# Patient Record
Sex: MALE | Race: BLACK OR AFRICAN AMERICAN | NOT HISPANIC OR LATINO | Employment: STUDENT | URBAN - METROPOLITAN AREA
[De-identification: names, ages, dates, MRNs, and addresses within clinical notes are randomized per-mention and may not be internally consistent; named-entity substitution may affect disease eponyms.]

---

## 2023-06-22 ENCOUNTER — OFFICE VISIT (OUTPATIENT)
Dept: PRIMARY CARE | Facility: CLINIC | Age: 23
End: 2023-06-22
Payer: MEDICAID

## 2023-06-22 ENCOUNTER — LAB (OUTPATIENT)
Dept: LAB | Facility: LAB | Age: 23
End: 2023-06-22
Payer: MEDICAID

## 2023-06-22 VITALS
WEIGHT: 315 LBS | HEART RATE: 110 BPM | SYSTOLIC BLOOD PRESSURE: 163 MMHG | HEIGHT: 75 IN | DIASTOLIC BLOOD PRESSURE: 91 MMHG | TEMPERATURE: 96.7 F | BODY MASS INDEX: 39.17 KG/M2 | OXYGEN SATURATION: 100 %

## 2023-06-22 DIAGNOSIS — I10 PRIMARY HYPERTENSION: ICD-10-CM

## 2023-06-22 DIAGNOSIS — R00.0 TACHYCARDIA: ICD-10-CM

## 2023-06-22 DIAGNOSIS — I10 PRIMARY HYPERTENSION: Primary | ICD-10-CM

## 2023-06-22 DIAGNOSIS — H61.22 IMPACTED CERUMEN OF LEFT EAR: ICD-10-CM

## 2023-06-22 PROBLEM — H01.00A BLEPHARITIS OF BOTH UPPER AND LOWER EYELID OF RIGHT EYE: Status: ACTIVE | Noted: 2023-06-22

## 2023-06-22 PROBLEM — H54.7 VISION IMPAIRMENT: Status: ACTIVE | Noted: 2023-06-22

## 2023-06-22 PROBLEM — F41.9 ANXIETY: Status: ACTIVE | Noted: 2023-06-22

## 2023-06-22 PROBLEM — H00.14 CHALAZION OF LEFT UPPER EYELID: Status: ACTIVE | Noted: 2023-06-22

## 2023-06-22 PROBLEM — F94.0 SELECTIVE MUTISM: Status: ACTIVE | Noted: 2023-06-22

## 2023-06-22 PROBLEM — H57.89 EYE DRAINAGE: Status: ACTIVE | Noted: 2023-06-22

## 2023-06-22 PROBLEM — K59.09 CONSTIPATION, CHRONIC: Status: ACTIVE | Noted: 2023-06-22

## 2023-06-22 PROBLEM — J45.909 ASTHMA (HHS-HCC): Status: ACTIVE | Noted: 2023-06-22

## 2023-06-22 PROBLEM — H01.00B BLEPHARITIS OF BOTH UPPER AND LOWER EYELID OF LEFT EYE: Status: ACTIVE | Noted: 2023-06-22

## 2023-06-22 PROBLEM — R03.0 WHITE COAT SYNDROME WITHOUT HYPERTENSION: Status: ACTIVE | Noted: 2023-06-22

## 2023-06-22 PROBLEM — E66.9 OBESITY: Status: ACTIVE | Noted: 2023-06-22

## 2023-06-22 LAB
ALBUMIN (G/DL) IN SER/PLAS: 4.9 G/DL (ref 3.4–5)
ANION GAP IN SER/PLAS: 17 MMOL/L (ref 10–20)
CALCIUM (MG/DL) IN SER/PLAS: 10.5 MG/DL (ref 8.6–10.6)
CARBON DIOXIDE, TOTAL (MMOL/L) IN SER/PLAS: 25 MMOL/L (ref 21–32)
CHLORIDE (MMOL/L) IN SER/PLAS: 103 MMOL/L (ref 98–107)
CHOLESTEROL (MG/DL) IN SER/PLAS: 202 MG/DL (ref 0–199)
CHOLESTEROL IN HDL (MG/DL) IN SER/PLAS: 60.6 MG/DL
CHOLESTEROL/HDL RATIO: 3.3
CREATININE (MG/DL) IN SER/PLAS: 0.82 MG/DL (ref 0.5–1.3)
ESTIMATED AVERAGE GLUCOSE FOR HBA1C: 108 MG/DL
GFR MALE: >90 ML/MIN/1.73M2
GLUCOSE (MG/DL) IN SER/PLAS: 94 MG/DL (ref 74–99)
HEMOGLOBIN A1C/HEMOGLOBIN TOTAL IN BLOOD: 5.4 %
LDL: 122 MG/DL (ref 0–109)
NON HDL CHOLESTEROL: 141 MG/DL (ref 0–119)
PHOSPHATE (MG/DL) IN SER/PLAS: 2.9 MG/DL (ref 2.5–4.9)
POTASSIUM (MMOL/L) IN SER/PLAS: 4.5 MMOL/L (ref 3.5–5.3)
SODIUM (MMOL/L) IN SER/PLAS: 140 MMOL/L (ref 136–145)
THYROTROPIN (MIU/L) IN SER/PLAS BY DETECTION LIMIT <= 0.05 MIU/L: 0.7 MIU/L (ref 0.44–3.98)
TRIGLYCERIDE (MG/DL) IN SER/PLAS: 99 MG/DL (ref 0–149)
UREA NITROGEN (MG/DL) IN SER/PLAS: 8 MG/DL (ref 6–23)
VLDL: 20 MG/DL (ref 0–40)

## 2023-06-22 PROCEDURE — 1036F TOBACCO NON-USER: CPT | Performed by: STUDENT IN AN ORGANIZED HEALTH CARE EDUCATION/TRAINING PROGRAM

## 2023-06-22 PROCEDURE — 99204 OFFICE O/P NEW MOD 45 MIN: CPT | Performed by: STUDENT IN AN ORGANIZED HEALTH CARE EDUCATION/TRAINING PROGRAM

## 2023-06-22 PROCEDURE — 83036 HEMOGLOBIN GLYCOSYLATED A1C: CPT

## 2023-06-22 PROCEDURE — 36415 COLL VENOUS BLD VENIPUNCTURE: CPT

## 2023-06-22 PROCEDURE — 3080F DIAST BP >= 90 MM HG: CPT | Performed by: STUDENT IN AN ORGANIZED HEALTH CARE EDUCATION/TRAINING PROGRAM

## 2023-06-22 PROCEDURE — 80061 LIPID PANEL: CPT

## 2023-06-22 PROCEDURE — 84443 ASSAY THYROID STIM HORMONE: CPT

## 2023-06-22 PROCEDURE — 69210 REMOVE IMPACTED EAR WAX UNI: CPT | Performed by: STUDENT IN AN ORGANIZED HEALTH CARE EDUCATION/TRAINING PROGRAM

## 2023-06-22 PROCEDURE — 3077F SYST BP >= 140 MM HG: CPT | Performed by: STUDENT IN AN ORGANIZED HEALTH CARE EDUCATION/TRAINING PROGRAM

## 2023-06-22 PROCEDURE — 80069 RENAL FUNCTION PANEL: CPT

## 2023-06-22 RX ORDER — ACETAMINOPHEN 325 MG/1
TABLET, FILM COATED ORAL EVERY 6 HOURS
COMMUNITY
Start: 2022-08-10

## 2023-06-22 RX ORDER — METOPROLOL SUCCINATE 25 MG/1
25 TABLET, EXTENDED RELEASE ORAL DAILY
Qty: 30 TABLET | Refills: 2 | Status: SHIPPED | OUTPATIENT
Start: 2023-06-22 | End: 2023-09-08 | Stop reason: ALTCHOICE

## 2023-06-22 RX ORDER — ALBUTEROL SULFATE 90 UG/1
AEROSOL, METERED RESPIRATORY (INHALATION)
COMMUNITY
Start: 2021-01-10

## 2023-06-22 RX ORDER — CHLORTHALIDONE 25 MG/1
12.5 TABLET ORAL DAILY
Qty: 15 TABLET | Refills: 2 | Status: SHIPPED | OUTPATIENT
Start: 2023-06-22 | End: 2023-09-08 | Stop reason: SDUPTHER

## 2023-06-22 RX ORDER — FLUTICASONE PROPIONATE 110 UG/1
AEROSOL, METERED RESPIRATORY (INHALATION) 2 TIMES DAILY
COMMUNITY
Start: 2021-01-21

## 2023-06-22 ASSESSMENT — PAIN SCALES - GENERAL: PAINLEVEL: 0-NO PAIN

## 2023-06-22 NOTE — PROGRESS NOTES
"Subjective   Patient ID: Erwin Smith is a 22 y.o. male who presents for Ear Fullness (Left ).    Here to establish care as an adult; presents with his mother.    Reports concern about blood pressure. Ongoing issues with BP since age 19. Has previously seen nephrology with 24-hour ambulatory blood pressure monitoring which was normal and was diagnosed with white coat hypertension. Family checks intermittently at home and number has been as high as 200s at home. Denies chest pain, dyspnea, leg swelling, headaches, vision changes. Has previously tried following the DASH diet strictly with resolution of hypertension but was unable to maintain that diet.    Decreased hearing in the left ear, ongoing for several months. Mother tried to flush the ear without success and patient tolerated it poorly so she did not try again. No pain in the ear.    Family history of hypertension in mother, siblings. Heart disease in grandmother and mother. Grave's disease in mother and younger sibling. Renal disease in younger sibling.    Review of Systems: As above in HPI    Objective   BP (!) 163/91   Pulse 110   Temp 35.9 °C (96.7 °F) (Tympanic)   Ht 1.905 m (6' 3\")   Wt (!) 166 kg (365 lb)   SpO2 100%   BMI 45.62 kg/m²  Body mass index is 45.62 kg/m².    Physical Exam  Vitals reviewed.   Constitutional:       General: He is not in acute distress.     Appearance: Normal appearance. He is obese.   HENT:      Head: Normocephalic and atraumatic.      Right Ear: Tympanic membrane and ear canal normal.      Left Ear: External ear normal. There is impacted cerumen (view of canal initially obscured by cerumen which was removed (see below procedure note) however canal remained obscured by impaced cerumen).   Eyes:      Conjunctiva/sclera: Conjunctivae normal.   Cardiovascular:      Rate and Rhythm: Normal rate and regular rhythm.      Heart sounds: No murmur heard.     No friction rub. No gallop.   Pulmonary:      Effort: Pulmonary " effort is normal.      Breath sounds: No wheezing, rhonchi or rales.   Abdominal:      Palpations: Abdomen is soft.      Tenderness: There is no abdominal tenderness.   Skin:     General: Skin is warm and dry.   Neurological:      General: No focal deficit present.      Mental Status: He is alert.       Ear Cerumen Removal    Date/Time: 6/22/2023 11:30 AM    Performed by: Genaro Ramirez MD  Authorized by: Lisa Cornell MD    Consent:     Consent obtained:  Verbal    Consent given by:  Patient    Risks, benefits, and alternatives were discussed: yes      Risks discussed:  Bleeding, pain and incomplete removal    Alternatives discussed:  Alternative treatment  Universal protocol:     Procedure explained and questions answered to patient or proxy's satisfaction: yes      Patient identity confirmed:  Verbally with patient  Procedure details:     Location:  L ear    Procedure type: curette      Procedure outcomes: cerumen removed (initially visualized obstructing cerumen was removed however deep impacted cerumen removal was not attempted)    Post-procedure details:     Inspection:  Some cerumen remaining    Hearing quality:  Diminished (unchanged)    Procedure completion:  Tolerated    Assessment/Plan   Problem List Items Addressed This Visit       Impacted cerumen of left ear     -incomplete removal in office  -recommended Debrox drops for several days followed by gentle irrigation  -follow up if no improvement         Relevant Medications    carbamide peroxide (Debrox) 6.5 % otic solution    Primary hypertension - Primary     -prior diagnosis of white coat hypertension however with persistently elevated BP at home and here  -given strong family history of hypertension and cardiac disease will opt to initiate treatment  -start chlorthalidone 12.5 mg daily  -check RFP, A1c, lipid panel for further evaluation  -DASH diet         Relevant Medications    chlorthalidone (Hygroton) 25 mg tablet    metoprolol  succinate XL (Toprol-XL) 25 mg 24 hr tablet    Other Relevant Orders    Renal Function Panel (Completed)    Lipid Panel (Completed)    Hemoglobin A1C (Completed)    Tachycardia     -suspect sinus tachycardia based on exam  -unclear cause, however there is a strong family history of thyroid disease  -check TSH  -start metoprolol succinate 25 mg daily in addition to chlorthalidone         Relevant Medications    metoprolol succinate XL (Toprol-XL) 25 mg 24 hr tablet    Other Relevant Orders    Tsh With Reflex To Free T4 If Abnormal (Completed)     Follow up in 2 weeks for reassessment of BP.    Patient was seen in conjunction with Elijah Carrillo MS3; much of the above history was obtained by the medical student and verified by myself.    Patient seen and discussed with Dr. Cornell.    Genaro Ramirez MD   PGY-2  [facility]

## 2023-06-22 NOTE — PATIENT INSTRUCTIONS
Dear Mr. Smith,     It was a pleasure getting to manage your care with you today.     Labs:  We ordered Labs for you at the  Labs.     Directions to  Labs:   1. You can find them as you exit our clinic walk past the coffee shop.   2. Turn Right and walk to the end of the zimmer (you will see a staircase).   3. When you arrive at the end of the zimmer, turn left and walk down the hallway with skylights.   4. Half way down the rj light hallway you will see the lab on your left    Prescriptions:  We have sent medication prescriptions to your pharmacy on file. Please pick them up at your earliest convenience.     Follow up Appointment: 2 weeks for blood pressure reevaluation    Emergency  In the case of an emergency please call 911 or visit the Emergency Department immediately for evaluation.     We look forward to continuing your care here at our Clinic. Take Care.     Sincerely,   Genaro Ramirez MD

## 2023-06-26 PROBLEM — R00.0 TACHYCARDIA: Status: ACTIVE | Noted: 2023-06-26

## 2023-06-26 NOTE — ASSESSMENT & PLAN NOTE
-incomplete removal in office  -recommended Debrox drops for several days followed by gentle irrigation  -follow up if no improvement

## 2023-06-26 NOTE — ASSESSMENT & PLAN NOTE
-prior diagnosis of white coat hypertension however with persistently elevated BP at home and here  -given strong family history of hypertension and cardiac disease will opt to initiate treatment  -start chlorthalidone 12.5 mg daily  -check RFP, A1c, lipid panel for further evaluation  -DASH diet

## 2023-06-26 NOTE — ASSESSMENT & PLAN NOTE
-suspect sinus tachycardia based on exam  -unclear cause, however there is a strong family history of thyroid disease  -check TSH  -start metoprolol succinate 25 mg daily in addition to chlorthalidone

## 2023-07-27 NOTE — PROGRESS NOTES
I saw and evaluated the patient. I personally obtained the key and critical portions of the history and physical exam or was physically present for key and critical portions performed by the resident/fellow. I reviewed the resident/fellow's documentation and discussed the patient with the resident/fellow. I agree with the resident/fellow's medical decision making as documented in the note.    Lisa Cornell MD

## 2023-09-08 ENCOUNTER — OFFICE VISIT (OUTPATIENT)
Dept: PRIMARY CARE | Facility: CLINIC | Age: 23
End: 2023-09-08
Payer: MEDICAID

## 2023-09-08 VITALS
HEART RATE: 111 BPM | SYSTOLIC BLOOD PRESSURE: 154 MMHG | TEMPERATURE: 98.6 F | OXYGEN SATURATION: 97 % | BODY MASS INDEX: 45.97 KG/M2 | WEIGHT: 315 LBS | DIASTOLIC BLOOD PRESSURE: 109 MMHG

## 2023-09-08 DIAGNOSIS — R00.0 TACHYCARDIA: Primary | ICD-10-CM

## 2023-09-08 DIAGNOSIS — I10 PRIMARY HYPERTENSION: ICD-10-CM

## 2023-09-08 LAB
ALBUMIN (MG/L) IN URINE: 7.1 MG/L
ALBUMIN/CREATININE (UG/MG) IN URINE: 4.2 UG/MG CRT (ref 0–30)
CREATININE (MG/DL) IN URINE: 169 MG/DL (ref 20–370)

## 2023-09-08 PROCEDURE — 82570 ASSAY OF URINE CREATININE: CPT

## 2023-09-08 PROCEDURE — 1036F TOBACCO NON-USER: CPT | Performed by: STUDENT IN AN ORGANIZED HEALTH CARE EDUCATION/TRAINING PROGRAM

## 2023-09-08 PROCEDURE — 3080F DIAST BP >= 90 MM HG: CPT | Performed by: STUDENT IN AN ORGANIZED HEALTH CARE EDUCATION/TRAINING PROGRAM

## 2023-09-08 PROCEDURE — 99213 OFFICE O/P EST LOW 20 MIN: CPT | Performed by: STUDENT IN AN ORGANIZED HEALTH CARE EDUCATION/TRAINING PROGRAM

## 2023-09-08 PROCEDURE — 3077F SYST BP >= 140 MM HG: CPT | Performed by: STUDENT IN AN ORGANIZED HEALTH CARE EDUCATION/TRAINING PROGRAM

## 2023-09-08 PROCEDURE — 93000 ELECTROCARDIOGRAM COMPLETE: CPT | Performed by: FAMILY MEDICINE

## 2023-09-08 PROCEDURE — 82043 UR ALBUMIN QUANTITATIVE: CPT

## 2023-09-08 RX ORDER — CHLORTHALIDONE 25 MG/1
12.5 TABLET ORAL DAILY
Qty: 15 TABLET | Refills: 2 | Status: SHIPPED | OUTPATIENT
Start: 2023-09-08 | End: 2024-05-28 | Stop reason: SDUPTHER

## 2023-09-08 RX ORDER — PROPRANOLOL HYDROCHLORIDE 20 MG/1
20 TABLET ORAL 2 TIMES DAILY
Qty: 60 TABLET | Refills: 2 | Status: SHIPPED | OUTPATIENT
Start: 2023-09-08 | End: 2024-05-28 | Stop reason: SDUPTHER

## 2023-09-08 NOTE — LETTER
September 8, 2023     Patient: Erwin Smith   YOB: 2000   Date of Visit: 9/8/2023       To Whom It May Concern:    Erwin Smith was seen in my clinic on 9/8/2023 at 2:30 pm. Please excuse Erwin for his absence from work on this day to make the appointment.    If you have any questions or concerns, please don't hesitate to call.         Sincerely,         Genaro Ramirez MD        CC: No Recipients

## 2023-09-08 NOTE — PATIENT INSTRUCTIONS
Dear Erwin Smith,     It was a pleasure getting to manage your care with you today.     Prescriptions:  We have sent medication prescriptions to your pharmacy on file. Please pick them up at your earliest convenience.   Stop taking the metoprolol. Start taking the propranolol instead.    Follow up Appointment: 2 weeks telehealth to review blood pressure logs    Emergency  In the case of an emergency please call 911 or visit the Emergency Department immediately for evaluation.     We look forward to continuing your care here at our Clinic. Take Care.     Sincerely,   Genaro Ramirez MD

## 2023-09-08 NOTE — PROGRESS NOTES
Subjective   Patient ID: Erwin Smith is a 22 y.o. male who presents for Follow-up.    Has been taking medications for blood pressure as prescribed. Was started on metoprolol and chlorthalidone last visit, reports taking normally. No headaches, no vision changes, no chest pain or difficulty breathing. No dysuria, hematuria, difficulty urinating. No leg swelling. Has long history of significant anxiety associated with doctor's visits and has prior diagnosis of white coat hypertension after normal 24-hour ambulatory blood pressure monitoring with nephrology ~2 years ago. No recent blood pressure checks at home.    Hearing has improved since last visit, no longer worried about cerumen obstruction.      Review of Systems   Eyes:  Negative for visual disturbance.   Respiratory:  Negative for shortness of breath.    Cardiovascular:  Negative for chest pain and leg swelling.   Genitourinary:  Negative for difficulty urinating, dysuria and hematuria.   Neurological:  Negative for headaches.     Objective   BP (!) 170/121 (BP Location: Right arm, Patient Position: Sitting, BP Cuff Size: Adult)   Pulse (!) 111   Temp 37 °C (98.6 °F) (Temporal)   Wt (!) 167 kg (367 lb 12.8 oz)   SpO2 97%   BMI 45.97 kg/m²  Body mass index is 45.97 kg/m².    Physical Exam  Vitals reviewed.   Constitutional:       General: He is not in acute distress.     Appearance: Normal appearance.   HENT:      Head: Normocephalic and atraumatic.   Eyes:      Conjunctiva/sclera: Conjunctivae normal.   Cardiovascular:      Rate and Rhythm: Regular rhythm. Tachycardia present.      Heart sounds: No murmur heard.     No friction rub. No gallop.   Pulmonary:      Effort: Pulmonary effort is normal. No respiratory distress.      Breath sounds: No wheezing, rhonchi or rales.   Skin:     General: Skin is warm and dry.   Neurological:      Mental Status: He is alert. Mental status is at baseline.   Psychiatric:         Behavior: Behavior normal.        Assessment/Plan   Problem List Items Addressed This Visit       Primary hypertension     -no evidence of LVH on ECG  -given that he has prior diagnosis of white coat hypertension patient was instructed to keep home blood pressure diary over next 2 weeks to monitor pressures at home  -continue chlorthalidone 12.5 mg daily in the interim  -will adjust therapy based on results         Relevant Medications    propranolol (Inderal) 20 mg tablet    chlorthalidone (Hygroton) 25 mg tablet    Other Relevant Orders    ECG 12 lead (Clinic Performed) (Completed)    Albumin , Urine Random (Completed)    Tachycardia - Primary     -EKG today shows NSR, although patient was tachycardic on arrival  -suspect there is an element of anxiety to tachycardia  -transition metoprolol to propranolol 20 mg BID for non-selective beta blocking properties         Relevant Medications    propranolol (Inderal) 20 mg tablet    Other Relevant Orders    ECG 12 lead (Clinic Performed) (Completed)     Follow up in 2 weeks after completion of home BP diary for reassessment.    Patient discussed with Dr. Whiting.    Genaro Ramirez MD   PGY-3  [facility]

## 2023-09-10 ASSESSMENT — ENCOUNTER SYMPTOMS
SHORTNESS OF BREATH: 0
HEADACHES: 0
DIFFICULTY URINATING: 0
DYSURIA: 0
HEMATURIA: 0

## 2023-09-11 NOTE — ASSESSMENT & PLAN NOTE
-EKG today shows NSR, although patient was tachycardic on arrival  -suspect there is an element of anxiety to tachycardia  -transition metoprolol to propranolol 20 mg BID for non-selective beta blocking properties

## 2023-09-11 NOTE — ASSESSMENT & PLAN NOTE
-no evidence of LVH on ECG  -given that he has prior diagnosis of white coat hypertension patient was instructed to keep home blood pressure diary over next 2 weeks to monitor pressures at home  -continue chlorthalidone 12.5 mg daily in the interim  -will adjust therapy based on results

## 2023-09-21 NOTE — PROGRESS NOTES
I reviewed with the resident the medical history and the resident’s findings on physical examination.  I discussed with the resident the patient’s diagnosis and concur with the treatment plan as documented in the resident note.     Mery Whiting MD

## 2024-04-23 ENCOUNTER — HOSPITAL ENCOUNTER (EMERGENCY)
Facility: HOSPITAL | Age: 24
Discharge: HOME | End: 2024-04-23
Attending: EMERGENCY MEDICINE
Payer: MEDICAID

## 2024-04-23 VITALS
TEMPERATURE: 98.1 F | OXYGEN SATURATION: 99 % | WEIGHT: 315 LBS | HEIGHT: 75 IN | BODY MASS INDEX: 39.17 KG/M2 | RESPIRATION RATE: 16 BRPM | SYSTOLIC BLOOD PRESSURE: 158 MMHG | HEART RATE: 93 BPM | DIASTOLIC BLOOD PRESSURE: 97 MMHG

## 2024-04-23 DIAGNOSIS — B95.8 MARGINAL KERATITIS OF RIGHT EYE DUE TO STAPHYLOCOCCUS TOXIN: ICD-10-CM

## 2024-04-23 DIAGNOSIS — H16.041 MARGINAL KERATITIS OF RIGHT EYE DUE TO STAPHYLOCOCCUS TOXIN: ICD-10-CM

## 2024-04-23 DIAGNOSIS — I10 HYPERTENSION, UNSPECIFIED TYPE: Primary | ICD-10-CM

## 2024-04-23 PROCEDURE — 99284 EMERGENCY DEPT VISIT MOD MDM: CPT | Performed by: EMERGENCY MEDICINE

## 2024-04-23 PROCEDURE — 99284 EMERGENCY DEPT VISIT MOD MDM: CPT

## 2024-04-23 PROCEDURE — 2500000001 HC RX 250 WO HCPCS SELF ADMINISTERED DRUGS (ALT 637 FOR MEDICARE OP): Mod: SE | Performed by: EMERGENCY MEDICINE

## 2024-04-23 RX ORDER — MOXIFLOXACIN 5 MG/ML
1 SOLUTION/ DROPS OPHTHALMIC 4 TIMES DAILY
Qty: 3 ML | Refills: 0 | Status: SHIPPED | OUTPATIENT
Start: 2024-04-23

## 2024-04-23 RX ORDER — CHLORTHALIDONE 25 MG/1
12.5 TABLET ORAL DAILY
Qty: 15 TABLET | Refills: 1 | Status: SHIPPED | OUTPATIENT
Start: 2024-04-23 | End: 2024-05-28 | Stop reason: SDUPTHER

## 2024-04-23 RX ORDER — PROPRANOLOL HYDROCHLORIDE 20 MG/1
20 TABLET ORAL 2 TIMES DAILY
Qty: 60 TABLET | Refills: 1 | Status: SHIPPED | OUTPATIENT
Start: 2024-04-23 | End: 2024-05-28 | Stop reason: SDUPTHER

## 2024-04-23 RX ORDER — TETRACAINE HYDROCHLORIDE 5 MG/ML
1 SOLUTION OPHTHALMIC ONCE
Status: COMPLETED | OUTPATIENT
Start: 2024-04-23 | End: 2024-04-23

## 2024-04-23 RX ORDER — PREDNISOLONE ACETATE 10 MG/ML
1 SUSPENSION/ DROPS OPHTHALMIC 3 TIMES DAILY
Qty: 5 ML | Refills: 0 | Status: SHIPPED | OUTPATIENT
Start: 2024-04-23

## 2024-04-23 RX ADMIN — TETRACAINE HYDROCHLORIDE 1 DROP: 5 SOLUTION OPHTHALMIC at 17:35

## 2024-04-23 RX ADMIN — FLUORESCEIN SODIUM 1 STRIP: 1 STRIP OPHTHALMIC at 17:35

## 2024-04-23 ASSESSMENT — PAIN DESCRIPTION - LOCATION: LOCATION: EYE

## 2024-04-23 ASSESSMENT — PAIN - FUNCTIONAL ASSESSMENT: PAIN_FUNCTIONAL_ASSESSMENT: 0-10

## 2024-04-23 ASSESSMENT — PAIN DESCRIPTION - PAIN TYPE: TYPE: ACUTE PAIN

## 2024-04-23 ASSESSMENT — COLUMBIA-SUICIDE SEVERITY RATING SCALE - C-SSRS
2. HAVE YOU ACTUALLY HAD ANY THOUGHTS OF KILLING YOURSELF?: NO
1. IN THE PAST MONTH, HAVE YOU WISHED YOU WERE DEAD OR WISHED YOU COULD GO TO SLEEP AND NOT WAKE UP?: NO
6. HAVE YOU EVER DONE ANYTHING, STARTED TO DO ANYTHING, OR PREPARED TO DO ANYTHING TO END YOUR LIFE?: NO

## 2024-04-23 ASSESSMENT — PAIN SCALES - GENERAL: PAINLEVEL_OUTOF10: 6

## 2024-04-23 NOTE — DISCHARGE INSTRUCTIONS
Use the 2 eyedrops as prescribed for your eye infection and use artificial tears as needed for comfort.  Continue with lid scrubs and apply warm compresses over the right eye.  If you have worsening redness of the eye, swelling, pain with eye movements, changes in vision or fevers return to the ER for evaluation.  Otherwise follow-up with ophthalmology.

## 2024-04-23 NOTE — Clinical Note
Erwin Smith was seen and treated in our emergency department on 4/23/2024.  He may return to work on 04/25/2024.       If you have any questions or concerns, please don't hesitate to call.      Carol Riggs, DO

## 2024-04-23 NOTE — CONSULTS
History of Present Illness:  This is a 23yoM with a hx of blepharitis and chalazia (status post (s/p) excision with Dr. Julien of the left upper eyelid (ROSALIO)) without known contact lens wear here for right eye redness, irritation, FBS. Reports vision is stable. No sick contacts and no recent URI. Works at a [employer]. No flashes or floaters. No eye pain.     ROS: All other systems have been reviewed and are negative.    PMHx: please refer to admission HPI  Medications: please refer to medication reconciliation  Allergies: please refer to patient allergy list  Past Ocular History: as per above HPI  Family History: reviewed and noncontributory to chief ophthalmic complaint  Orientation: Alert and oriented x3, appropriate mood and behavior    Examination:     Base Eye Exam       Visual Acuity (Snellen - Linear)         Right Left    Near sc 20/25 PH 20/20 20/20              Tonometry (Tonopen, 7:35 PM)         Right Left    Pressure 15 14              Pupils         Pupils Dark Light Shape React APD    Right PERRL, No APD 4 2 Round Reactive None    Left PERRL, No APD 4 2 Round Reactive None              Visual Fields         Left Right     Full Full              Extraocular Movement         Right Left     Full, Ortho Full, Ortho              Dilation       Both eyes: 2.5% phenylephrine, 1% tropicamide @ 7:35 PM                  Additional Tests       Color         Right Left    Ishihara 11/11 11/11                  Slit Lamp and Fundus Exam       Slit Lamp Exam         Right Left    Lids/Lashes Scalloped borders with 2+ MGD Scalloped borders with 2+ meibomian gland dysfunction (MGD), chalazion at the mid to nasal upper eyelid    Conjunctiva/Sclera Moderate injection Moderate injection    Cornea Multiple stromal infiltrates at 10, 1:30, 7, and 8 oclock with the largest being at 10 oclock. These are at the lid, cornea junction and in the peripheral cornea. Mild staining at the 10 oclock lesion. 1+ SPK    Anterior  Chamber Deep and quiet Deep and quiet    Iris Round and reactive Round and reactive    Lens Clear Clear    Anterior Vitreous Normal Normal              Fundus Exam         Right Left    Disc Normal Normal    C/D Ratio 0.3 0.3    Macula Normal Normal    Vessels Normal Normal    Periphery Normal Normal                        Assessment and Plan:  # Staph marginal keratitis  - Several stromal infiltrates in a circumferential patter at the peripheral cornea at 10, 1:30, 7, and 8 oclock at the lid, cornea junction. Hx of blepharitis and chalazia. Most likely an immune response characterizing the staph marginal keratitis picture. Other diagnoses considered were viral conjunctivitis however no apparent follicles, URI, sick contacts, or drainage, and HSV keratitis, however, no discernable dendrites. Bacterial keratitis also considered but given the lack of trauma and ulcer picture, in addition to multiple stromal infiltrates, less likely.  - Recommend prednisolone TID right eye for 7-10 days  - Given mild staining of one of the lesions, will recommend moxifloxacin QID right eye for 7-10 days  - Recommend AT QID both eyes for comfort  - Recommend WC, lid scrubs for hygiene  - Return precautions given for changes in vision, increased pain and redness, and drainage  - Ophthalmology will arrange follow up      Glynn Woodruff MD, PhD  Ophthalmology PGY-2      Ophthalmology Adult Pager - [phone]  Ophthalmology Pediatrics Pager - [phone]    For adult follow-up appointments, call: [phone]  For pediatric follow-up appointments, call: [phone]      NOTE: This note is not finalized until attending reviews and signs.

## 2024-04-23 NOTE — ED TRIAGE NOTES
Patient comes in today with R eye swelling and redness for the past 4 days; denies itchiness but states that it does hurt; denies blurry vision, endorses photophobia; denies any injury; states that he does have a history of styes

## 2024-04-23 NOTE — ED PROVIDER NOTES
HPI   Chief Complaint   Patient presents with    Eye Problem       23-year-old male with past medical history of hypertension presenting to the ED for right eye evaluation.  Patient states 3 days ago he noticed some swelling of the right eye that he believes was similar to the styes he has had previously.  Felt some irritation like there was something stuck in his eye.  Denies any injuries to the eye.  Denies changes in vision or eye pain.  No headaches, fevers, vomiting.  Does not wear glasses or contacts.  States his symptoms have worsened and the eye has become more red so he came to the ED for evaluation.                            Remy Coma Scale Score: 15                     Patient History   Past Medical History:   Diagnosis Date    Other conditions influencing health status     No significant past medical history    Otitis media, unspecified, right ear 03/02/2018    Right acute otitis media    Personal history of other diseases of the circulatory system 01/21/2021    History of hypertension    Rash and other nonspecific skin eruption 08/12/2017    Rash     Past Surgical History:   Procedure Laterality Date    APPENDECTOMY  10/07/2015    Appendectomy     No family history on file.  Social History     Tobacco Use    Smoking status: Never    Smokeless tobacco: Never   Substance Use Topics    Alcohol use: Never    Drug use: Never       Physical Exam   ED Triage Vitals [04/23/24 1617]   Temperature Heart Rate Respirations BP   36.7 °C (98.1 °F) 93 16 (!) 158/97      Pulse Ox Temp src Heart Rate Source Patient Position   99 % -- -- --      BP Location FiO2 (%)     -- --       Physical Exam  Vitals and nursing note reviewed.   Constitutional:       General: He is not in acute distress.     Appearance: He is not toxic-appearing.   HENT:      Head: Normocephalic and atraumatic.      Nose: Nose normal.      Mouth/Throat:      Mouth: Mucous membranes are moist.   Eyes:      Comments: Left eye normal.  Right eye  conjunctival injection and right lid drooping.  There is edema to the mucosal surfaces of the right upper and lower eyelids.  There is haziness over the right cornea and 3 punctate white areas on the left upper outer quadrant.  No pain with extraocular movements.   Cardiovascular:      Rate and Rhythm: Normal rate and regular rhythm.      Pulses: Normal pulses.      Heart sounds: Normal heart sounds.   Pulmonary:      Effort: Pulmonary effort is normal.      Breath sounds: Normal breath sounds.   Abdominal:      General: There is no distension.      Palpations: Abdomen is soft.      Tenderness: There is no abdominal tenderness.   Musculoskeletal:         General: Normal range of motion.      Cervical back: Normal range of motion and neck supple.      Right lower leg: No edema.      Left lower leg: No edema.   Skin:     General: Skin is warm and dry.      Capillary Refill: Capillary refill takes less than 2 seconds.   Neurological:      General: No focal deficit present.      Mental Status: He is alert and oriented to person, place, and time. Mental status is at baseline.         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  There is no pain to the eye.  Patient is afebrile and well-appearing.  Low suspicion for orbital cellulitis.  No injury to the eye.  No evidence of globe rupture.  There is no dye uptake at the abnormal lesions seen over the cornea, do not suspect ulcerations.  His visual fields are intact.    Ophthalmology was consulted and evaluated the patient in the ED.  Patient diagnosed with staph marginal keratitis and given prescriptions for prednisone, moxifloxacin and artificial tears.  Advised to continue with lid scrubs and warm compresses.  He will follow-up with ophthalmology in the  clinic for further evaluation and return to the ED for worsening symptoms.        Procedure  Procedures     Carol Riggs DO  Resident  04/23/24 [id number]

## 2024-04-30 ENCOUNTER — HOSPITAL ENCOUNTER (OUTPATIENT)
Dept: CARDIOLOGY | Facility: HOSPITAL | Age: 24
Discharge: HOME | End: 2024-04-30
Payer: MEDICAID

## 2024-04-30 PROCEDURE — 93005 ELECTROCARDIOGRAM TRACING: CPT

## 2024-05-28 ENCOUNTER — OFFICE VISIT (OUTPATIENT)
Dept: PRIMARY CARE | Facility: CLINIC | Age: 24
End: 2024-05-28
Payer: MEDICAID

## 2024-05-28 VITALS
HEART RATE: 99 BPM | RESPIRATION RATE: 18 BRPM | WEIGHT: 315 LBS | BODY MASS INDEX: 40.43 KG/M2 | TEMPERATURE: 97.5 F | OXYGEN SATURATION: 100 % | DIASTOLIC BLOOD PRESSURE: 106 MMHG | SYSTOLIC BLOOD PRESSURE: 163 MMHG | HEIGHT: 74 IN

## 2024-05-28 DIAGNOSIS — I10 PRIMARY HYPERTENSION: ICD-10-CM

## 2024-05-28 DIAGNOSIS — Z82.79 FAMILY HISTORY OF CONGENITAL HEART DISEASE: Primary | ICD-10-CM

## 2024-05-28 DIAGNOSIS — E66.01 CLASS 3 SEVERE OBESITY DUE TO EXCESS CALORIES WITH SERIOUS COMORBIDITY AND BODY MASS INDEX (BMI) OF 40.0 TO 44.9 IN ADULT (MULTI): ICD-10-CM

## 2024-05-28 DIAGNOSIS — H61.22 IMPACTED CERUMEN OF LEFT EAR: ICD-10-CM

## 2024-05-28 DIAGNOSIS — I10 WHITE COAT SYNDROME WITH HYPERTENSION: ICD-10-CM

## 2024-05-28 PROBLEM — B95.8: Status: RESOLVED | Noted: 2024-04-23 | Resolved: 2024-05-28

## 2024-05-28 PROBLEM — E66.813 CLASS 3 SEVERE OBESITY DUE TO EXCESS CALORIES WITH SERIOUS COMORBIDITY AND BODY MASS INDEX (BMI) OF 40.0 TO 44.9 IN ADULT: Status: ACTIVE | Noted: 2023-06-22

## 2024-05-28 PROBLEM — H16.041: Status: RESOLVED | Noted: 2024-04-23 | Resolved: 2024-05-28

## 2024-05-28 PROCEDURE — 3008F BODY MASS INDEX DOCD: CPT | Performed by: STUDENT IN AN ORGANIZED HEALTH CARE EDUCATION/TRAINING PROGRAM

## 2024-05-28 PROCEDURE — 1036F TOBACCO NON-USER: CPT | Performed by: STUDENT IN AN ORGANIZED HEALTH CARE EDUCATION/TRAINING PROGRAM

## 2024-05-28 PROCEDURE — 99213 OFFICE O/P EST LOW 20 MIN: CPT | Performed by: STUDENT IN AN ORGANIZED HEALTH CARE EDUCATION/TRAINING PROGRAM

## 2024-05-28 PROCEDURE — 3080F DIAST BP >= 90 MM HG: CPT | Performed by: STUDENT IN AN ORGANIZED HEALTH CARE EDUCATION/TRAINING PROGRAM

## 2024-05-28 PROCEDURE — 3077F SYST BP >= 140 MM HG: CPT | Performed by: STUDENT IN AN ORGANIZED HEALTH CARE EDUCATION/TRAINING PROGRAM

## 2024-05-28 RX ORDER — CHLORTHALIDONE 25 MG/1
12.5 TABLET ORAL DAILY
Qty: 15 TABLET | Refills: 3 | Status: SHIPPED | OUTPATIENT
Start: 2024-05-28 | End: 2024-09-25

## 2024-05-28 RX ORDER — DEXTRAN 70, GLYCERIN, HYPROMELLOSE 1; 2; 3 MG/ML; MG/ML; MG/ML
SOLUTION/ DROPS OPHTHALMIC
COMMUNITY
Start: 2024-04-23

## 2024-05-28 RX ORDER — PROPRANOLOL HYDROCHLORIDE 20 MG/1
20 TABLET ORAL 2 TIMES DAILY
Qty: 60 TABLET | Refills: 3 | Status: SHIPPED | OUTPATIENT
Start: 2024-05-28 | End: 2024-09-25

## 2024-05-28 ASSESSMENT — PATIENT HEALTH QUESTIONNAIRE - PHQ9
SUM OF ALL RESPONSES TO PHQ9 QUESTIONS 1 AND 2: 0
1. LITTLE INTEREST OR PLEASURE IN DOING THINGS: NOT AT ALL
2. FEELING DOWN, DEPRESSED OR HOPELESS: NOT AT ALL

## 2024-05-28 ASSESSMENT — ENCOUNTER SYMPTOMS
DEPRESSION: 0
LOSS OF SENSATION IN FEET: 0
OCCASIONAL FEELINGS OF UNSTEADINESS: 0

## 2024-05-28 ASSESSMENT — PAIN SCALES - GENERAL: PAINLEVEL: 0-NO PAIN

## 2024-05-28 NOTE — ASSESSMENT & PLAN NOTE
-BP elevated above goal of <140/90 with current BP of 163/106  -given that he has prior diagnosis of white coat hypertension patient was instructed to keep home blood pressure diary to monitor pressures at home  -continue chlorthalidone 12.5 mg daily in the interim  -will adjust therapy based on results

## 2024-05-28 NOTE — PATIENT INSTRUCTIONS
Dear Mr. Smith,    It was a pleasure getting to manage your care with you today.     Labs:  We ordered Labs for you at the  Labs.     Directions to  Labs:   1. You can find them as you exit our clinic walk past the coffee shop.   2. Turn Right and walk to the end of the zimmer (you will see a staircase).   3. When you arrive at the end of the zimmer, turn left and walk down the hallway with skylights.   4. Half way down the rj light hallway you will see the lab on your left    Prescriptions:  We have sent medication prescriptions to your pharmacy on file. Please pick them up at your earliest convenience.     Referral:   We have provided you the below referrals. Please call to schedule referrals if no one has contacted you within 3 days.   1. ENT if you have continued problems with ear wax    Follow up Appointment: If blood pressure is good at home, follow up in 6 months; if top number is >150 at home, follow up sooner and message me on myChart    Emergency  In the case of an emergency please call 911 or visit the Emergency Department immediately for evaluation.     We look forward to continuing your care here at our Clinic. Take Care.     Sincerely,   Genaro Ramirez MD

## 2024-05-28 NOTE — ASSESSMENT & PLAN NOTE
-excess, although soft appearing cerumen in the L canal which is symptomatic  -patient does report intermittent ear drop use  -removed cerumen from the L canal as noted in procedure note however cerumen was incompletely removed  -recommended gentle home ear irrigation and continued ear drop use and will refer to ENT if symptoms do not improve

## 2024-05-28 NOTE — PROGRESS NOTES
"Subjective   Patient ID: Erwin Smith is a 23 y.o. male who presents for Follow-up and Med Refill.    Taking chlorthalidone and propranolol and reports no side effects. Hasn't had either one in 3 days since he ran out and needs refills. Was just at the ER for his eyes and his blood pressure was similar. He was taking his medication then. Has a cuff at home but hasn't been checking it very regularly.    Currently down 20 lbs compared to weight in April. Has been working a lot at [employer] and thinks that might be helping. Has been trying to cut down on serving sizes; has been more careful about the foods he's eating. Trying to eat 1-2 good meals daily. No fevers, chills, no constipation or diarrhea, no changes in urination, no excess diaphoresis, no nausea/vomiting.    Notes that his left ear continues to feel clogged with cerumen.    Mother (present at visit over the phone) mentions that he has a strong family history of heart disease and that his younger sibling has a structural heart abnormality. Erwin was recommended to get his heart evaluated as well after this finding was discovered in his sibling.      Review of Systems  As above in HPI    Objective   BP (!) 163/106 (BP Location: Left arm, Patient Position: Sitting, BP Cuff Size: Adult)   Pulse 99   Temp 36.4 °C (97.5 °F) (Temporal)   Resp 18   Ht 1.88 m (6' 2\")   Wt (!) 155 kg (341 lb 4.8 oz)   SpO2 100%   BMI 43.82 kg/m²  Body mass index is 43.82 kg/m².    Physical Exam  Vitals reviewed.   Constitutional:       General: He is not in acute distress.     Appearance: Normal appearance.   HENT:      Head: Normocephalic and atraumatic.      Ears:      Comments: Mild cerumen in the R canal w/ TM visualized and normal.  Large amount of soft cerumen in L canal fully obscuring TM.  Eyes:      Conjunctiva/sclera: Conjunctivae normal.   Cardiovascular:      Rate and Rhythm: Normal rate and regular rhythm.      Pulses: Normal " pulses.      Heart sounds: No murmur heard.     No friction rub. No gallop.   Pulmonary:      Effort: Pulmonary effort is normal. No respiratory distress.      Breath sounds: Normal breath sounds. No wheezing, rhonchi or rales.   Musculoskeletal:      Cervical back: Neck supple.   Skin:     General: Skin is warm and dry.   Neurological:      Mental Status: He is alert. Mental status is at baseline.   Psychiatric:         Mood and Affect: Affect normal.       Ear Cerumen Removal    Date/Time: 5/28/2024 5:09 PM    Performed by: Genaro Ramirez MD  Authorized by: Eder Moore MD    Consent:     Consent obtained:  Verbal    Consent given by:  Patient    Risks, benefits, and alternatives were discussed: yes      Risks discussed:  Bleeding, pain, TM perforation and incomplete removal    Alternatives discussed:  Alternative treatment and referral  Universal protocol:     Procedure explained and questions answered to patient or proxy's satisfaction: yes    Procedure details:     Location:  L ear    Procedure type: curette      Procedure outcomes: cerumen removed    Post-procedure details:     Inspection:  Macerated skin and some cerumen remaining    Procedure completion:  Tolerated well, no immediate complications  Comments:      Large quantity of cerumen removed from canal however unable to remove full amount due to proximity to TM and difficulty with visualization.    Assessment/Plan   Erwin Smith is a 24yo male with a history of obesity, asthma, anxiety, and white-coat hypertension.    Problem List Items Addressed This Visit             ICD-10-CM    Class 3 severe obesity due to excess calories with serious comorbidity and body mass index (BMI) of 40.0 to 44.9 in adult (Multi) E66.01, Z68.41     -significant weight loss noted after dietary modification  -provided encouragement regarding his dietary changes  -discussed increasing physical activity and patient identified basketball as one way he could get  more exercise         White coat syndrome with hypertension I10     -see plan for primary hypertension         Relevant Medications    chlorthalidone (Hygroton) 25 mg tablet    propranolol (Inderal) 20 mg tablet    Other Relevant Orders    Transthoracic Echo (TTE) Complete    Impacted cerumen of left ear H61.22     -excess, although soft appearing cerumen in the L canal which is symptomatic  -patient does report intermittent ear drop use  -removed cerumen from the L canal as noted in procedure note however cerumen was incompletely removed  -recommended gentle home ear irrigation and continued ear drop use and will refer to ENT if symptoms do not improve         Primary hypertension I10     -BP elevated above goal of <140/90 with current BP of 163/106  -given that he has prior diagnosis of white coat hypertension patient was instructed to keep home blood pressure diary to monitor pressures at home  -continue chlorthalidone 12.5 mg daily in the interim  -will adjust therapy based on results         Relevant Medications    chlorthalidone (Hygroton) 25 mg tablet    propranolol (Inderal) 20 mg tablet    Other Relevant Orders    Transthoracic Echo (TTE) Complete     Other Visit Diagnoses         Codes    Family history of congenital heart disease    -  Primary Z82.79    -given strong family history of congenital heart disease and significant hypertension will order echo     Relevant Orders    Transthoracic Echo (TTE) Complete        Will plan to contact patient in 2 weeks to assess blood pressure and if well controlled will have him follow up in 6 months.    Patient discussed with Dr. Moore.    Genaro Ramirez MD   PGY-3

## 2024-05-28 NOTE — ASSESSMENT & PLAN NOTE
-significant weight loss noted after dietary modification  -provided encouragement regarding his dietary changes  -discussed increasing physical activity and patient identified basketball as one way he could get more exercise

## 2024-06-07 NOTE — PROGRESS NOTES
I reviewed the resident documentation and discussed the patient with the resident. I agree with the resident's medical decision making as documented in the note.    Eder Moore MD

## 2024-10-30 ENCOUNTER — HOSPITAL ENCOUNTER (EMERGENCY)
Facility: HOSPITAL | Age: 24
Discharge: HOME | End: 2024-10-30
Attending: EMERGENCY MEDICINE
Payer: MEDICAID

## 2024-10-30 VITALS
DIASTOLIC BLOOD PRESSURE: 99 MMHG | SYSTOLIC BLOOD PRESSURE: 159 MMHG | RESPIRATION RATE: 16 BRPM | OXYGEN SATURATION: 96 % | HEART RATE: 86 BPM | TEMPERATURE: 97.6 F

## 2024-10-30 DIAGNOSIS — J06.9 VIRAL UPPER RESPIRATORY TRACT INFECTION: Primary | ICD-10-CM

## 2024-10-30 LAB
FLUAV RNA RESP QL NAA+PROBE: NOT DETECTED
FLUBV RNA RESP QL NAA+PROBE: NOT DETECTED
SARS-COV-2 RNA RESP QL NAA+PROBE: NOT DETECTED

## 2024-10-30 PROCEDURE — 99284 EMERGENCY DEPT VISIT MOD MDM: CPT | Performed by: EMERGENCY MEDICINE

## 2024-10-30 PROCEDURE — 99283 EMERGENCY DEPT VISIT LOW MDM: CPT

## 2024-10-30 PROCEDURE — 87636 SARSCOV2 & INF A&B AMP PRB: CPT

## 2024-10-30 ASSESSMENT — LIFESTYLE VARIABLES
HAVE PEOPLE ANNOYED YOU BY CRITICIZING YOUR DRINKING: NO
HAVE YOU EVER FELT YOU SHOULD CUT DOWN ON YOUR DRINKING: NO
EVER FELT BAD OR GUILTY ABOUT YOUR DRINKING: NO
EVER HAD A DRINK FIRST THING IN THE MORNING TO STEADY YOUR NERVES TO GET RID OF A HANGOVER: NO
TOTAL SCORE: 0

## 2024-10-30 ASSESSMENT — PAIN - FUNCTIONAL ASSESSMENT: PAIN_FUNCTIONAL_ASSESSMENT: 0-10

## 2024-10-30 ASSESSMENT — COLUMBIA-SUICIDE SEVERITY RATING SCALE - C-SSRS
2. HAVE YOU ACTUALLY HAD ANY THOUGHTS OF KILLING YOURSELF?: NO
6. HAVE YOU EVER DONE ANYTHING, STARTED TO DO ANYTHING, OR PREPARED TO DO ANYTHING TO END YOUR LIFE?: NO
1. IN THE PAST MONTH, HAVE YOU WISHED YOU WERE DEAD OR WISHED YOU COULD GO TO SLEEP AND NOT WAKE UP?: NO

## 2024-10-30 ASSESSMENT — PAIN SCALES - GENERAL: PAINLEVEL_OUTOF10: 0 - NO PAIN

## 2024-11-04 ENCOUNTER — APPOINTMENT (OUTPATIENT)
Dept: PRIMARY CARE | Facility: CLINIC | Age: 24
End: 2024-11-04
Payer: MEDICAID

## 2024-12-18 ENCOUNTER — TELEPHONE (OUTPATIENT)
Dept: PRIMARY CARE | Facility: CLINIC | Age: 24
End: 2024-12-18
Payer: MEDICAID

## 2024-12-18 NOTE — TELEPHONE ENCOUNTER
Patient mom called in and stated she's been leaving messages for two weeks to get medications refilled. I don't see any documents. She also stated he was scheduked with a doctor in November that left. The only recent appointment I see was with Fallon Walker on 11/04 in which patient is marked as a no show. I rescheduled a new appointment per request and patient is scheduled 02/10 with Dr. Lehman. Mom is asking if medications can be refilled until appointment. Please advise. Thanks!

## 2024-12-19 DIAGNOSIS — I10 WHITE COAT SYNDROME WITH HYPERTENSION: ICD-10-CM

## 2024-12-19 DIAGNOSIS — I10 PRIMARY HYPERTENSION: ICD-10-CM

## 2024-12-19 RX ORDER — CHLORTHALIDONE 25 MG/1
12.5 TABLET ORAL DAILY
Qty: 15 TABLET | Refills: 3 | Status: SHIPPED | OUTPATIENT
Start: 2024-12-19 | End: 2025-04-18

## 2024-12-19 RX ORDER — PROPRANOLOL HYDROCHLORIDE 20 MG/1
20 TABLET ORAL 2 TIMES DAILY
Qty: 60 TABLET | Refills: 3 | Status: SHIPPED | OUTPATIENT
Start: 2024-12-19 | End: 2025-04-18

## 2025-02-10 ENCOUNTER — APPOINTMENT (OUTPATIENT)
Facility: HOSPITAL | Age: 25
End: 2025-02-10
Payer: MEDICAID

## 2025-02-17 ENCOUNTER — APPOINTMENT (OUTPATIENT)
Facility: HOSPITAL | Age: 25
End: 2025-02-17
Payer: MEDICAID

## 2025-04-10 ENCOUNTER — APPOINTMENT (OUTPATIENT)
Facility: HOSPITAL | Age: 25
End: 2025-04-10